# Patient Record
Sex: FEMALE | Race: OTHER | NOT HISPANIC OR LATINO
[De-identification: names, ages, dates, MRNs, and addresses within clinical notes are randomized per-mention and may not be internally consistent; named-entity substitution may affect disease eponyms.]

---

## 2020-10-23 PROBLEM — Z00.00 ENCOUNTER FOR PREVENTIVE HEALTH EXAMINATION: Status: ACTIVE | Noted: 2020-10-23

## 2020-10-30 ENCOUNTER — APPOINTMENT (OUTPATIENT)
Dept: GYNECOLOGIC ONCOLOGY | Facility: CLINIC | Age: 75
End: 2020-10-30
Payer: MEDICARE

## 2020-10-30 VITALS
RESPIRATION RATE: 16 BRPM | BODY MASS INDEX: 24.63 KG/M2 | TEMPERATURE: 96.9 F | OXYGEN SATURATION: 98 % | HEIGHT: 62.99 IN | WEIGHT: 139 LBS | DIASTOLIC BLOOD PRESSURE: 77 MMHG | SYSTOLIC BLOOD PRESSURE: 132 MMHG | HEART RATE: 71 BPM

## 2020-10-30 PROCEDURE — 99072 ADDL SUPL MATRL&STAF TM PHE: CPT

## 2020-10-30 PROCEDURE — 99205 OFFICE O/P NEW HI 60 MIN: CPT

## 2020-10-30 NOTE — HISTORY OF PRESENT ILLNESS
[FreeTextEntry1] : Problem List\par 1. Right ovarian cyst\par 2. Endometrial stripe 5mm\par \par Previous Therapy\par 1. Office pelvic sonogram 9/2020\par     a) right ovarian complex cyst 4.3 x 3.6cm \par 2.  9/22/20 - 9 Normal \par 3. MRI Pelvis 10/5/20\par     a)  uterus is 5.2cm with 0.5cm endometrial stripe \par     a) The right ovary is enlarged containing a 4.3 x 3.0 x 3.3cm multiloculated cyst with enhancing septations suspicious for cystic ovarian neoplasm. No enhancing soft tissue components are seen.

## 2020-10-30 NOTE — ASSESSMENT
[FreeTextEntry1] : I discussed with the patient with the aid of diagrams, reviewed the findings on history and physical examination, and reviewed the imaging studies in detail.  We reviewed the cystic and solid component of the mass, the CA-125, and other tumor markers. ACOG management of adnexal masses has been reviewed. \par \par We discussed the differential diagnosis which includes benign, low malignant potential tumors and malignancy. Other etiologies of adnexal masses, such as metastatic disease from another organ site also discussed.\par \par In the case of asymptomatic cysts, without findings concerning for malignancy (such as solid components, increasing size and vascular septations, elevated tumor markers) conservative management is an option. \par \par In the case of symptomatic cysts, or those with findings concerning for malignancy, the recommendation is for surgical removal. Again, with the aid of diagrams, different surgical approaches discussed including minimally invasive and open approaches.\par \par Laparoscopic bilateral salpingoophorectomy discussed. Increased risk of cyst rupture with ovarian cystectomy explained. Frozen section will be performed; and the appropriate additional management including but not limited to removal of the other ovary, total hysterectomy, pelvic and para-aortic lymph node dissection, omentectomy and appendectomy. NCCN guidelines discussed. Possible laparotomy also discussed. The role of hysterectomy even in benign cases was discussed. We also discussed surgical options for correction of prolapse. Patient endorsed interest in a potentially joint procedure with urogyn. I explained that the approach to this part of the procedure would be different depending on benign vs. malignant findings.\par \par Complications that include, but are not limited to: bleeding, infection, injury to other organs including bowel, bladder, ureters, blood vessels, nerves; infections, blood clots, lymphedema, pneumonia, wound complications and prolonged hospital stay have all been discussed with the patient. Whenever minimally invasive surgery is attempted, there is a chance of needing to convert to laparotomy. The risk of occult injury requiring additional surgery also discussed. I have also provided her with the diagrams.  \par \par Surgical scheduling was discussed and instructions for optimization prior to surgery were given. will follow the Enhanced Recovery After Surgery (ERAS) protocol.  \par No aspirin or NSAID products for 1 week prior. \par She will choose a surgical date.\par \par [] Referral to Dr. Villa\par [] Medical clearance\par [] COVID-19 testing\par [] Robot assisted TLH/BSO possible staging\par

## 2020-10-30 NOTE — CHIEF COMPLAINT
[FreeTextEntry1] : 74 y/o referred from Dr. Richards for further evaluation of right ovarian cyst. This was an incidental finding. Patient is asymptomatic. 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

## 2020-10-30 NOTE — PHYSICAL EXAM
[Normal] : Recto-Vaginal Exam: Normal [Fully active, able to carry on all pre-disease performance without restriction] : Status 0 - Fully active, able to carry on all pre-disease performance without restriction [de-identified] : pessary removed and replaced. apical prolapse noted

## 2020-11-02 DIAGNOSIS — A49.9 URINARY TRACT INFECTION, SITE NOT SPECIFIED: ICD-10-CM

## 2020-11-02 DIAGNOSIS — N39.0 URINARY TRACT INFECTION, SITE NOT SPECIFIED: ICD-10-CM

## 2020-11-04 ENCOUNTER — APPOINTMENT (OUTPATIENT)
Dept: UROGYNECOLOGY | Facility: CLINIC | Age: 75
End: 2020-11-04
Payer: MEDICARE

## 2020-11-04 VITALS
TEMPERATURE: 93.3 F | SYSTOLIC BLOOD PRESSURE: 150 MMHG | BODY MASS INDEX: 24.63 KG/M2 | WEIGHT: 139 LBS | DIASTOLIC BLOOD PRESSURE: 90 MMHG | HEIGHT: 63 IN

## 2020-11-04 PROCEDURE — 99204 OFFICE O/P NEW MOD 45 MIN: CPT

## 2020-11-04 PROCEDURE — 99072 ADDL SUPL MATRL&STAF TM PHE: CPT

## 2020-11-04 PROCEDURE — 51798 US URINE CAPACITY MEASURE: CPT

## 2020-11-05 DIAGNOSIS — Z80.3 FAMILY HISTORY OF MALIGNANT NEOPLASM OF BREAST: ICD-10-CM

## 2020-11-05 DIAGNOSIS — Z78.9 OTHER SPECIFIED HEALTH STATUS: ICD-10-CM

## 2020-11-05 DIAGNOSIS — Z63.4 DISAPPEARANCE AND DEATH OF FAMILY MEMBER: ICD-10-CM

## 2020-11-05 DIAGNOSIS — I10 ESSENTIAL (PRIMARY) HYPERTENSION: ICD-10-CM

## 2020-11-05 DIAGNOSIS — E11.9 TYPE 2 DIABETES MELLITUS W/OUT COMPLICATIONS: ICD-10-CM

## 2020-11-05 RX ORDER — ALENDRONATE SODIUM 70 MG/1
TABLET ORAL
Refills: 0 | Status: ACTIVE | COMMUNITY

## 2020-11-05 RX ORDER — LOSARTAN POTASSIUM 100 MG/1
TABLET, FILM COATED ORAL
Refills: 0 | Status: ACTIVE | COMMUNITY

## 2020-11-05 RX ORDER — EMPAGLIFLOZIN 10 MG/1
10 TABLET, FILM COATED ORAL
Refills: 0 | Status: ACTIVE | COMMUNITY

## 2020-11-05 RX ORDER — METFORMIN HYDROCHLORIDE 850 MG/1
850 TABLET, COATED ORAL
Qty: 90 | Refills: 0 | Status: ACTIVE | COMMUNITY
Start: 2020-09-22

## 2020-11-05 RX ORDER — ATENOLOL 25 MG/1
25 TABLET ORAL
Refills: 0 | Status: ACTIVE | COMMUNITY

## 2020-11-05 SDOH — SOCIAL STABILITY - SOCIAL INSECURITY: DISSAPEARANCE AND DEATH OF FAMILY MEMBER: Z63.4

## 2020-11-06 LAB — BACTERIA UR CULT: NORMAL

## 2020-11-07 NOTE — HISTORY OF PRESENT ILLNESS
[FreeTextEntry1] : The pt is a 74 y/o P1 with UV prolapse for 3 yrs, ?bothersome\par She is wearing a pessary for the past 3 yrs, ring with support.  She is not able to manage it on her own.\par She denies UI .\par She feels complete emptying of her bladder.\par She voids every Q4 hrs with a medium volume and has nocturia 1x \par Her liquid intake consists of 8 glasses of water, 1 cup of coffee/day.\par She has a BM q day .\par She denies gross hematuria, recurrent UTIs, hx of nephrolithiaisis \par The last time she had intercourse was 10 yrs ago,  passed . \par She denies having abdominal surgery.\par Her medical prob consists of HTN, NIDDM.\par Last HbA1C was 7.5, in 10/2020\par \par

## 2020-11-07 NOTE — ASSESSMENT
[FreeTextEntry1] : Today's findings were discussed with the pt.  She will need a repeat exam wo the pessary.  She will f/u in 3 days for evaluation of her prolapse severity.   Also, her urine was sent for culture today.\par I will plan for concurrent surgery with Dr. Clayton.\par \par \par

## 2020-11-07 NOTE — PHYSICAL EXAM
[Anxiety] : patient is not anxious [Cough] : no cough [Dyspnea] : no dyspnea [Murmurs] : no murmurs were heard [Varicose Veins] : no varicose veins observed [Tracheal Deviation] : no tracheal deviation observed [Mass] : no ~M [unfilled] neck mass was observed [Mass (___ Cm)] : no ~M [unfilled] abdominal mass was palpated [Tenderness] : ~T no ~M abdominal tenderness observed [Distended] : not distended [H/Smegaly] : no hepatosplenomegaly [Hernia] : no hernia observed [Scar] : no scars [Estrogen Effect] : no estrogen effect was observed [de-identified] : good support otherwise.  Pessary removed just prior to exam

## 2020-11-09 ENCOUNTER — APPOINTMENT (OUTPATIENT)
Dept: UROGYNECOLOGY | Facility: CLINIC | Age: 75
End: 2020-11-09
Payer: MEDICARE

## 2020-11-09 PROCEDURE — 99072 ADDL SUPL MATRL&STAF TM PHE: CPT

## 2020-11-09 PROCEDURE — 51798 US URINE CAPACITY MEASURE: CPT

## 2020-11-09 PROCEDURE — 99213 OFFICE O/P EST LOW 20 MIN: CPT

## 2020-11-09 NOTE — HISTORY OF PRESENT ILLNESS
[FreeTextEntry1] : The pt is here for pelvic exam to assess the severity of her prolapse wo the pessary prior to undergoing surgery.

## 2020-11-09 NOTE — ASSESSMENT
[FreeTextEntry1] : She will be scheduled for UDS.\par She is a good candidate for robotic colpopexy if she does not have malignancy.  If + for cancer, will proceed with uterosacral ligament suspension.

## 2020-11-09 NOTE — PHYSICAL EXAM
[No Acute Distress] : in no acute distress [Well developed] : well developed [Well Nourished] : ~L well nourished [Good Hygeine] : demonstrates good hygeine [de-identified] : complete procidentia wo

## 2020-11-13 DIAGNOSIS — Z01.818 ENCOUNTER FOR OTHER PREPROCEDURAL EXAMINATION: ICD-10-CM

## 2020-11-16 ENCOUNTER — APPOINTMENT (OUTPATIENT)
Dept: UROGYNECOLOGY | Facility: CLINIC | Age: 75
End: 2020-11-16
Payer: MEDICARE

## 2020-11-16 ENCOUNTER — APPOINTMENT (OUTPATIENT)
Dept: DISASTER EMERGENCY | Facility: CLINIC | Age: 75
End: 2020-11-16

## 2020-11-16 PROCEDURE — 51798 US URINE CAPACITY MEASURE: CPT

## 2020-11-16 PROCEDURE — 51729 CYSTOMETROGRAM W/VP&UP: CPT

## 2020-11-16 PROCEDURE — 51797 INTRAABDOMINAL PRESSURE TEST: CPT

## 2020-11-16 PROCEDURE — 51741 ELECTRO-UROFLOWMETRY FIRST: CPT

## 2020-11-16 PROCEDURE — 99072 ADDL SUPL MATRL&STAF TM PHE: CPT

## 2020-11-16 PROCEDURE — 51784 ANAL/URINARY MUSCLE STUDY: CPT

## 2020-11-17 LAB — SARS-COV-2 N GENE NPH QL NAA+PROBE: NOT DETECTED

## 2020-11-18 ENCOUNTER — APPOINTMENT (OUTPATIENT)
Dept: UROGYNECOLOGY | Facility: CLINIC | Age: 75
End: 2020-11-18
Payer: MEDICARE

## 2020-11-18 ENCOUNTER — TRANSCRIPTION ENCOUNTER (OUTPATIENT)
Age: 75
End: 2020-11-18

## 2020-11-18 DIAGNOSIS — R93.89 ABNORMAL FINDINGS ON DIAGNOSTIC IMAGING OF OTHER SPECIFIED BODY STRUCTURES: ICD-10-CM

## 2020-11-18 PROCEDURE — 99213 OFFICE O/P EST LOW 20 MIN: CPT

## 2020-11-18 RX ORDER — IBUPROFEN 800 MG/1
800 TABLET, FILM COATED ORAL 3 TIMES DAILY
Qty: 30 | Refills: 0 | Status: ACTIVE | COMMUNITY
Start: 2020-11-18 | End: 1900-01-01

## 2020-11-18 RX ORDER — DOCUSATE SODIUM 100 MG/1
100 CAPSULE ORAL TWICE DAILY
Qty: 20 | Refills: 0 | Status: ACTIVE | COMMUNITY
Start: 2020-11-18 | End: 1900-01-01

## 2020-11-18 RX ORDER — OXYCODONE AND ACETAMINOPHEN 5; 325 MG/1; MG/1
5-325 TABLET ORAL
Qty: 10 | Refills: 0 | Status: ACTIVE | COMMUNITY
Start: 2020-11-18 | End: 1900-01-01

## 2020-11-18 RX ORDER — METFORMIN HYDROCHLORIDE 850 MG/1
0 TABLET ORAL
Qty: 0 | Refills: 0 | DISCHARGE

## 2020-11-18 NOTE — PATIENT PROFILE ADULT - HOME ACCESSIBILITY CONCERNS
Temo Barrientos)  Urology  [address] [place] [address], [address]  [place] [address]  Phone: [phone]  Fax: [phone]  Follow Up Time: 1-3 Days
none

## 2020-11-18 NOTE — HISTORY OF PRESENT ILLNESS
[FreeTextEntry1] : The pt is here for UDS F/U and to discuss surgical options.\par \par UDS: -JAIME, -DO, +complete emptying with prolapse reduction

## 2020-11-18 NOTE — ASSESSMENT
[FreeTextEntry1] : UDS result was dw the pt.\par She is scheduled to undergo robotic total hyst with BSO by Dr. Clayton tomorrow and I will perform pelvic reconstructive surgery for complete procidentia at the time of her hysterectomy.\par We discussed robotic colpopexy, vaginal USLS and colpocleisis depending on the path result.\par She is still interest in having intercourse in the future.\par The choice of surgery will be determined based on the path result intraoperatively.\par \par \par \par \par \par

## 2020-11-19 ENCOUNTER — APPOINTMENT (OUTPATIENT)
Dept: GYNECOLOGIC ONCOLOGY | Facility: HOSPITAL | Age: 75
End: 2020-11-19

## 2020-11-19 ENCOUNTER — NON-APPOINTMENT (OUTPATIENT)
Age: 75
End: 2020-11-19

## 2020-11-19 ENCOUNTER — APPOINTMENT (OUTPATIENT)
Dept: UROGYNECOLOGY | Facility: HOSPITAL | Age: 75
End: 2020-11-19
Payer: MEDICARE

## 2020-11-19 ENCOUNTER — INPATIENT (INPATIENT)
Facility: HOSPITAL | Age: 75
LOS: 0 days | Discharge: HOME | DRG: 743 | End: 2020-11-20
Attending: OBSTETRICS & GYNECOLOGY | Admitting: OBSTETRICS & GYNECOLOGY
Payer: MEDICARE

## 2020-11-19 ENCOUNTER — RESULT REVIEW (OUTPATIENT)
Age: 75
End: 2020-11-19

## 2020-11-19 VITALS
DIASTOLIC BLOOD PRESSURE: 72 MMHG | WEIGHT: 141.32 LBS | HEIGHT: 63 IN | RESPIRATION RATE: 16 BRPM | OXYGEN SATURATION: 99 % | SYSTOLIC BLOOD PRESSURE: 128 MMHG | TEMPERATURE: 97 F | HEART RATE: 52 BPM

## 2020-11-19 LAB
BACTERIA UR CULT: ABNORMAL
BLD GP AB SCN SERPL QL: NEGATIVE — SIGNIFICANT CHANGE UP
GLUCOSE BLDC GLUCOMTR-MCNC: 91 MG/DL — SIGNIFICANT CHANGE UP (ref 70–99)
RH IG SCN BLD-IMP: POSITIVE — SIGNIFICANT CHANGE UP

## 2020-11-19 PROCEDURE — 88342 IMHCHEM/IMCYTCHM 1ST ANTB: CPT | Mod: 26

## 2020-11-19 PROCEDURE — 58573 TLH W/T/O UTERUS OVER 250 G: CPT

## 2020-11-19 PROCEDURE — 88305 TISSUE EXAM BY PATHOLOGIST: CPT | Mod: 26

## 2020-11-19 PROCEDURE — 88341 IMHCHEM/IMCYTCHM EA ADD ANTB: CPT | Mod: 26

## 2020-11-19 PROCEDURE — 88112 CYTOPATH CELL ENHANCE TECH: CPT | Mod: 26

## 2020-11-19 PROCEDURE — 88307 TISSUE EXAM BY PATHOLOGIST: CPT | Mod: 26

## 2020-11-19 PROCEDURE — 52000 CYSTOURETHROSCOPY: CPT | Mod: 59

## 2020-11-19 PROCEDURE — 57425 LAPAROSCOPY SURG COLPOPEXY: CPT

## 2020-11-19 PROCEDURE — 88331 PATH CONSLTJ SURG 1 BLK 1SPC: CPT | Mod: 26

## 2020-11-19 RX ORDER — METOCLOPRAMIDE HCL 10 MG
10 TABLET ORAL EVERY 8 HOURS
Refills: 0 | Status: DISCONTINUED | OUTPATIENT
Start: 2020-11-19 | End: 2020-11-19

## 2020-11-19 RX ORDER — OXYCODONE HYDROCHLORIDE 5 MG/1
10 TABLET ORAL EVERY 6 HOURS
Refills: 0 | Status: DISCONTINUED | OUTPATIENT
Start: 2020-11-19 | End: 2020-11-20

## 2020-11-19 RX ORDER — INFLUENZA VIRUS VACCINE 15; 15; 15; 15 UG/.5ML; UG/.5ML; UG/.5ML; UG/.5ML
0.7 SUSPENSION INTRAMUSCULAR ONCE
Refills: 0 | Status: DISCONTINUED | OUTPATIENT
Start: 2020-11-19 | End: 2020-11-20

## 2020-11-19 RX ORDER — SODIUM CHLORIDE 9 MG/ML
1000 INJECTION, SOLUTION INTRAVENOUS
Refills: 0 | Status: DISCONTINUED | OUTPATIENT
Start: 2020-11-19 | End: 2020-11-20

## 2020-11-19 RX ORDER — INSULIN LISPRO 100/ML
VIAL (ML) SUBCUTANEOUS ONCE
Refills: 0 | Status: COMPLETED | OUTPATIENT
Start: 2020-11-19 | End: 2020-11-19

## 2020-11-19 RX ORDER — GABAPENTIN 400 MG/1
300 CAPSULE ORAL ONCE
Refills: 0 | Status: COMPLETED | OUTPATIENT
Start: 2020-11-19 | End: 2020-11-19

## 2020-11-19 RX ORDER — PHENAZOPYRIDINE HCL 100 MG
200 TABLET ORAL ONCE
Refills: 0 | Status: COMPLETED | OUTPATIENT
Start: 2020-11-19 | End: 2020-11-19

## 2020-11-19 RX ORDER — DEXTROSE 50 % IN WATER 50 %
15 SYRINGE (ML) INTRAVENOUS ONCE
Refills: 0 | Status: DISCONTINUED | OUTPATIENT
Start: 2020-11-19 | End: 2020-11-20

## 2020-11-19 RX ORDER — SENNA PLUS 8.6 MG/1
1 TABLET ORAL AT BEDTIME
Refills: 0 | Status: DISCONTINUED | OUTPATIENT
Start: 2020-11-19 | End: 2020-11-20

## 2020-11-19 RX ORDER — DEXTROSE 50 % IN WATER 50 %
25 SYRINGE (ML) INTRAVENOUS ONCE
Refills: 0 | Status: DISCONTINUED | OUTPATIENT
Start: 2020-11-19 | End: 2020-11-20

## 2020-11-19 RX ORDER — EMPAGLIFLOZIN 10 MG/1
1 TABLET, FILM COATED ORAL
Qty: 0 | Refills: 0 | DISCHARGE

## 2020-11-19 RX ORDER — GLUCAGON INJECTION, SOLUTION 0.5 MG/.1ML
1 INJECTION, SOLUTION SUBCUTANEOUS ONCE
Refills: 0 | Status: DISCONTINUED | OUTPATIENT
Start: 2020-11-19 | End: 2020-11-20

## 2020-11-19 RX ORDER — LOSARTAN POTASSIUM 100 MG/1
1 TABLET, FILM COATED ORAL
Qty: 0 | Refills: 0 | DISCHARGE

## 2020-11-19 RX ORDER — CELECOXIB 200 MG/1
400 CAPSULE ORAL ONCE
Refills: 0 | Status: COMPLETED | OUTPATIENT
Start: 2020-11-19 | End: 2020-11-19

## 2020-11-19 RX ORDER — OXYCODONE HYDROCHLORIDE 5 MG/1
5 TABLET ORAL
Refills: 0 | Status: DISCONTINUED | OUTPATIENT
Start: 2020-11-19 | End: 2020-11-19

## 2020-11-19 RX ORDER — ONDANSETRON 8 MG/1
8 TABLET, FILM COATED ORAL EVERY 6 HOURS
Refills: 0 | Status: DISCONTINUED | OUTPATIENT
Start: 2020-11-19 | End: 2020-11-20

## 2020-11-19 RX ORDER — HEPARIN SODIUM 5000 [USP'U]/ML
5000 INJECTION INTRAVENOUS; SUBCUTANEOUS ONCE
Refills: 0 | Status: COMPLETED | OUTPATIENT
Start: 2020-11-19 | End: 2020-11-19

## 2020-11-19 RX ORDER — HYDROMORPHONE HYDROCHLORIDE 2 MG/ML
0.5 INJECTION INTRAMUSCULAR; INTRAVENOUS; SUBCUTANEOUS
Refills: 0 | Status: DISCONTINUED | OUTPATIENT
Start: 2020-11-19 | End: 2020-11-20

## 2020-11-19 RX ORDER — ATENOLOL 25 MG/1
1 TABLET ORAL
Qty: 0 | Refills: 0 | DISCHARGE

## 2020-11-19 RX ORDER — INFLUENZA VIRUS VACCINE 15; 15; 15; 15 UG/.5ML; UG/.5ML; UG/.5ML; UG/.5ML
0.5 SUSPENSION INTRAMUSCULAR ONCE
Refills: 0 | Status: DISCONTINUED | OUTPATIENT
Start: 2020-11-19 | End: 2020-11-19

## 2020-11-19 RX ORDER — METFORMIN HYDROCHLORIDE 850 MG/1
0 TABLET ORAL
Qty: 0 | Refills: 0 | DISCHARGE

## 2020-11-19 RX ORDER — DEXTROSE 50 % IN WATER 50 %
12.5 SYRINGE (ML) INTRAVENOUS ONCE
Refills: 0 | Status: DISCONTINUED | OUTPATIENT
Start: 2020-11-19 | End: 2020-11-20

## 2020-11-19 RX ORDER — ACETAMINOPHEN 500 MG
1000 TABLET ORAL EVERY 6 HOURS
Refills: 0 | Status: DISCONTINUED | OUTPATIENT
Start: 2020-11-19 | End: 2020-11-19

## 2020-11-19 RX ORDER — SIMETHICONE 80 MG/1
80 TABLET, CHEWABLE ORAL EVERY 6 HOURS
Refills: 0 | Status: DISCONTINUED | OUTPATIENT
Start: 2020-11-19 | End: 2020-11-20

## 2020-11-19 RX ORDER — OXYCODONE HYDROCHLORIDE 5 MG/1
10 TABLET ORAL EVERY 4 HOURS
Refills: 0 | Status: DISCONTINUED | OUTPATIENT
Start: 2020-11-19 | End: 2020-11-19

## 2020-11-19 RX ORDER — ACETAMINOPHEN 500 MG
975 TABLET ORAL EVERY 6 HOURS
Refills: 0 | Status: DISCONTINUED | OUTPATIENT
Start: 2020-11-19 | End: 2020-11-20

## 2020-11-19 RX ORDER — ENOXAPARIN SODIUM 100 MG/ML
40 INJECTION SUBCUTANEOUS EVERY 24 HOURS
Refills: 0 | Status: DISCONTINUED | OUTPATIENT
Start: 2020-11-20 | End: 2020-11-20

## 2020-11-19 RX ORDER — ACETAMINOPHEN 500 MG
1000 TABLET ORAL ONCE
Refills: 0 | Status: COMPLETED | OUTPATIENT
Start: 2020-11-19 | End: 2020-11-19

## 2020-11-19 RX ORDER — SODIUM CHLORIDE 9 MG/ML
1000 INJECTION, SOLUTION INTRAVENOUS
Refills: 0 | Status: DISCONTINUED | OUTPATIENT
Start: 2020-11-19 | End: 2020-11-19

## 2020-11-19 RX ORDER — ONDANSETRON 8 MG/1
8 TABLET, FILM COATED ORAL EVERY 8 HOURS
Refills: 0 | Status: DISCONTINUED | OUTPATIENT
Start: 2020-11-19 | End: 2020-11-19

## 2020-11-19 RX ORDER — OXYCODONE HYDROCHLORIDE 5 MG/1
5 TABLET ORAL EVERY 4 HOURS
Refills: 0 | Status: DISCONTINUED | OUTPATIENT
Start: 2020-11-19 | End: 2020-11-20

## 2020-11-19 RX ADMIN — GABAPENTIN 300 MILLIGRAM(S): 400 CAPSULE ORAL at 10:23

## 2020-11-19 RX ADMIN — Medication 200 MILLIGRAM(S): at 10:24

## 2020-11-19 RX ADMIN — Medication 1 TABLET(S): at 20:08

## 2020-11-19 RX ADMIN — CELECOXIB 400 MILLIGRAM(S): 200 CAPSULE ORAL at 10:25

## 2020-11-19 RX ADMIN — Medication 1000 MILLIGRAM(S): at 18:54

## 2020-11-19 RX ADMIN — Medication 975 MILLIGRAM(S): at 23:29

## 2020-11-19 RX ADMIN — HEPARIN SODIUM 5000 UNIT(S): 5000 INJECTION INTRAVENOUS; SUBCUTANEOUS at 10:23

## 2020-11-19 RX ADMIN — CELECOXIB 400 MILLIGRAM(S): 200 CAPSULE ORAL at 10:23

## 2020-11-19 RX ADMIN — Medication 1000 MILLIGRAM(S): at 10:23

## 2020-11-19 RX ADMIN — Medication 400 MILLIGRAM(S): at 18:31

## 2020-11-19 RX ADMIN — Medication 1000 MILLIGRAM(S): at 10:25

## 2020-11-19 NOTE — PROGRESS NOTE ADULT - SUBJECTIVE AND OBJECTIVE BOX
Patient seen and examined at bedside. No acute complaints. Pain well controlled.  Patient tolerating clears. Has not yet passed flatus. Maravilla in place. Denies CP, SOB, N/V, fevers, and chills.    Vital Signs Last 24 Hours  T(C): 37.3 (11-19-20 @ 20:24), Max: 37.3 (11-19-20 @ 20:24)  HR: 55 (11-19-20 @ 20:24) (50 - 59)  BP: 117/65 (11-19-20 @ 20:24) (105/59 - 130/61)  RR: 15 (11-19-20 @ 20:24) (14 - 23)  SpO2: 96% (11-19-20 @ 20:24) (96% - 100%)    I&O's Summary    19 Nov 2020 07:01  -  19 Nov 2020 20:29  --------------------------------------------------------  IN: 1275 mL / OUT: 575 mL / NET: 700 mL        Physical Exam:  General: NAD  CV: NR, RR, S1, S2, no M/R/G  Lungs: CTA-B  Abdomen: Soft, non-distended, appropriately tender  Incision: port site incisions CDI, dermabond in place   Ext: No pain or swelling    Labs:      MEDICATIONS  (STANDING):  acetaminophen   Tablet .. 975 milliGRAM(s) Oral every 6 hours  dextrose 40% Gel 15 Gram(s) Oral Once  dextrose 5%. 1000 milliLiter(s) (50 mL/Hr) IV Continuous <Continuous>  dextrose 5%. 1000 milliLiter(s) (100 mL/Hr) IV Continuous <Continuous>  dextrose 50% Injectable 25 Gram(s) IV Push Once  dextrose 50% Injectable 12.5 Gram(s) IV Push Once  dextrose 50% Injectable 25 Gram(s) IV Push Once  glucagon  Injectable 1 milliGRAM(s) IntraMuscular Once  influenza  Vaccine (HIGH DOSE) 0.7 milliLiter(s) IntraMuscular once  insulin lispro (ADMELOG) corrective regimen sliding scale   SubCutaneous Once  lactated ringers. 1000 milliLiter(s) (125 mL/Hr) IV Continuous <Continuous>  trimethoprim  160 mG/sulfamethoxazole 800 mG 1 Tablet(s) Oral two times a day    MEDICATIONS  (PRN):  HYDROmorphone  Injectable 0.5 milliGRAM(s) IV Push every 15 minutes PRN Severe Pain (7 - 10)  ondansetron Injectable 8 milliGRAM(s) IV Push every 6 hours PRN Nausea and/or Vomiting  oxyCODONE    IR 5 milliGRAM(s) Oral every 4 hours PRN Moderate Pain (4 - 6)  oxyCODONE    IR 10 milliGRAM(s) Oral every 6 hours PRN Severe Pain (7 - 10)  senna 1 Tablet(s) Oral at bedtime PRN Constipation  simethicone 80 milliGRAM(s) Chew every 6 hours PRN Gas

## 2020-11-19 NOTE — BRIEF OPERATIVE NOTE - NSICDXBRIEFPROCEDURE_GEN_ALL_CORE_FT
PROCEDURES:  Bilateral salpingoophorectomy 19-Nov-2020 13:34:45  Jenelle Lam  Robot-assisted total hysterectomy for uterus less than 250 grams 19-Nov-2020 13:34:31  eJnelle Lam  
PROCEDURES:  Cystoscopy 19-Nov-2020 16:11:31  Ruth Aparicio  Sacrocolpopexy, robot-assisted 19-Nov-2020 16:09:39  Ruth Aparicio

## 2020-11-19 NOTE — H&P ADULT - HISTORY OF PRESENT ILLNESS
75y P1 with right ovarian complex cyst presenting and uterovaginal prolapse for scheduled RA TLH BSO and possible colpopexy, vaginal USLS. Patient has uterovaginal prolapse and complex right ovarian cyst. Patients  is normal at 9.7.     Denies fever, chills, chest pain, palpitations, SOB, n/v.    MRI 10/5/20: uterus 5.2cm with 0.5 endometrial stripe, right ovary enlarged with 4.3 x 3x 3.3cm multiloculated cyst with enhancing septations suspicious for cystic ovarian neoplasm, no enhancing soft tissue components    OB H/x:  x1    GYN H/x: right ovarian cyst    MED H/x: HTN , DM    SURG H/x: denies     Medications:  metformin 850mg BID  atenolol 25mg qd  losartan 25mg qd  jardiance 10mg qd  ASDA 81mg qd   Allergies:  NKDA        Vital Signs Last 24 Hrs  T(C): 36.3 (2020 10:00), Max: 36.3 (2020 10:00)  T(F): 97.4 (2020 10:00), Max: 97.4 (2020 10:00)  HR: 52 (2020 10:00) (52 - 52)  BP: 128/72 (2020 10:00) (128/72 - 128/72)  BP(mean): --  RR: 16 (2020 10:00) (16 - 16)  SpO2: 99% (2020 10:00) (99% - 99%)    Physical Exam:  Gen: NAD, comfortable  GI: soft, nontender, nondistended + BS, no rebound no guarding  Ext: no edema, erythema, tenderness     LABS:                RADIOLOGY & ADDITIONAL STUDIES:

## 2020-11-19 NOTE — BRIEF OPERATIVE NOTE - OPERATION/FINDINGS
Robotic assisted laparoscopy revealed right upper abdominal wall adhesions that were carefully lysed. Laparoscopic revealed about 8 wk size uterus with adhesions noted between left adnexa and left side wall. Normal appearing right fallopian tube and right ovarian cyst with thin wall and non solid appearing contents. RA laparoscopic total hysterectomy and bilateral salpingooophorectomy. Cervix, uterus, bilateral fallopian tubes, and bilateral ovaries with right cyst removed through vagina. Right ovarian cyst sent for frozen pathology results benign. Vaginal cuff closed with V lock suture, good hemostasis confirmed. Pelvis thoroughly suctioned and irrigated. Reminder of procedure by Uro-Gyn Dr. ALL Villa.
Patient immediately s/p hysterectomy at time our portion of the procedure began.  RA sacrocolpopexy performed with mesh.  Cystoscopy performed and revealed intact bladder with bilateral ureteral jets visualized.

## 2020-11-19 NOTE — H&P ADULT - ASSESSMENT
75y P1 with right ovarian complex cyst presenting and uterovaginal prolapse for scheduled RA TLH BSO and possible colpopexy, vaginal USLS.  - Hgb 14.2, Cr 0.58  - 2uprbc on hold   - consents obtained by Dr. Clayton and Dr. Villa

## 2020-11-19 NOTE — PROGRESS NOTE ADULT - ASSESSMENT
75 year old POD#0 s/p RA Total Laparoscopic Hysterectomy, BSO, sacrocolpopexy and cystoscopy without complication. Patient is stable and doing well postoperatively.       Neuro: tylenol ATC; dilaudid PRN   CV: Hemodynamically stable  Pulm: Saturating well on room air. Encourage incentive spirometry  GI: Continue to advance diet as tolerated   : UOP adequate, monitor overnight  Heme: SCDs; f/u AM CBC, lovenox in AM     Andrés Deras PGY2

## 2020-11-19 NOTE — BRIEF OPERATIVE NOTE - NSICDXBRIEFPOSTOP_GEN_ALL_CORE_FT
POST-OP DIAGNOSIS:  Right ovarian cyst 19-Nov-2020 13:35:36  Jenelle Lam  
POST-OP DIAGNOSIS:  Prolapse of female pelvic organs 19-Nov-2020 16:12:01  Ruth Aparicio

## 2020-11-19 NOTE — PRE-OP CHECKLIST - ISOLATION PRECAUTIONS
none radiology results/return to ED if symptoms worsen, persist or questions arise/need for outpatient follow-up

## 2020-11-19 NOTE — BRIEF OPERATIVE NOTE - NSICDXBRIEFPREOP_GEN_ALL_CORE_FT
PRE-OP DIAGNOSIS:  Complex cyst of right ovary 19-Nov-2020 13:35:02  Jenelle Lam  
PRE-OP DIAGNOSIS:  Prolapse of female pelvic organs 19-Nov-2020 16:11:48  Ruth Aparicio

## 2020-11-20 ENCOUNTER — TRANSCRIPTION ENCOUNTER (OUTPATIENT)
Age: 75
End: 2020-11-20

## 2020-11-20 VITALS
HEART RATE: 61 BPM | SYSTOLIC BLOOD PRESSURE: 139 MMHG | RESPIRATION RATE: 18 BRPM | DIASTOLIC BLOOD PRESSURE: 61 MMHG | OXYGEN SATURATION: 97 % | TEMPERATURE: 98 F

## 2020-11-20 LAB
A1C WITH ESTIMATED AVERAGE GLUCOSE RESULT: 6.8 % — HIGH (ref 4–5.6)
ESTIMATED AVERAGE GLUCOSE: 148 MG/DL — HIGH (ref 68–114)
HCT VFR BLD CALC: 38.1 % — SIGNIFICANT CHANGE UP (ref 34.5–45)
HCV AB S/CO SERPL IA: 0.06 S/CO — SIGNIFICANT CHANGE UP
HCV AB SERPL-IMP: SIGNIFICANT CHANGE UP
HGB BLD-MCNC: 12.3 G/DL — SIGNIFICANT CHANGE UP (ref 11.5–15.5)
MCHC RBC-ENTMCNC: 30.5 PG — SIGNIFICANT CHANGE UP (ref 27–34)
MCHC RBC-ENTMCNC: 32.3 GM/DL — SIGNIFICANT CHANGE UP (ref 32–36)
MCV RBC AUTO: 94.5 FL — SIGNIFICANT CHANGE UP (ref 80–100)
NON-GYNECOLOGICAL CYTOLOGY STUDY: SIGNIFICANT CHANGE UP
NRBC # BLD: 0 /100 WBCS — SIGNIFICANT CHANGE UP (ref 0–0)
PLATELET # BLD AUTO: 162 K/UL — SIGNIFICANT CHANGE UP (ref 150–400)
RBC # BLD: 4.03 M/UL — SIGNIFICANT CHANGE UP (ref 3.8–5.2)
RBC # FLD: 13.2 % — SIGNIFICANT CHANGE UP (ref 10.3–14.5)
WBC # BLD: 8.18 K/UL — SIGNIFICANT CHANGE UP (ref 3.8–10.5)
WBC # FLD AUTO: 8.18 K/UL — SIGNIFICANT CHANGE UP (ref 3.8–10.5)

## 2020-11-20 PROCEDURE — 86900 BLOOD TYPING SEROLOGIC ABO: CPT

## 2020-11-20 PROCEDURE — 36415 COLL VENOUS BLD VENIPUNCTURE: CPT

## 2020-11-20 PROCEDURE — 85027 COMPLETE CBC AUTOMATED: CPT

## 2020-11-20 PROCEDURE — 83036 HEMOGLOBIN GLYCOSYLATED A1C: CPT

## 2020-11-20 PROCEDURE — 88331 PATH CONSLTJ SURG 1 BLK 1SPC: CPT

## 2020-11-20 PROCEDURE — 86850 RBC ANTIBODY SCREEN: CPT

## 2020-11-20 PROCEDURE — S2900: CPT

## 2020-11-20 PROCEDURE — 88307 TISSUE EXAM BY PATHOLOGIST: CPT

## 2020-11-20 PROCEDURE — C1781: CPT

## 2020-11-20 PROCEDURE — 82962 GLUCOSE BLOOD TEST: CPT

## 2020-11-20 PROCEDURE — 86901 BLOOD TYPING SEROLOGIC RH(D): CPT

## 2020-11-20 PROCEDURE — 88112 CYTOPATH CELL ENHANCE TECH: CPT

## 2020-11-20 PROCEDURE — 86803 HEPATITIS C AB TEST: CPT

## 2020-11-20 PROCEDURE — C1889: CPT

## 2020-11-20 PROCEDURE — 88305 TISSUE EXAM BY PATHOLOGIST: CPT

## 2020-11-20 PROCEDURE — 88341 IMHCHEM/IMCYTCHM EA ADD ANTB: CPT

## 2020-11-20 RX ORDER — INSULIN LISPRO 100/ML
VIAL (ML) SUBCUTANEOUS
Refills: 0 | Status: DISCONTINUED | OUTPATIENT
Start: 2020-11-20 | End: 2020-11-20

## 2020-11-20 RX ADMIN — Medication 975 MILLIGRAM(S): at 05:27

## 2020-11-20 RX ADMIN — Medication 975 MILLIGRAM(S): at 05:50

## 2020-11-20 RX ADMIN — Medication 975 MILLIGRAM(S): at 12:47

## 2020-11-20 RX ADMIN — Medication 975 MILLIGRAM(S): at 13:47

## 2020-11-20 RX ADMIN — ENOXAPARIN SODIUM 40 MILLIGRAM(S): 100 INJECTION SUBCUTANEOUS at 05:27

## 2020-11-20 RX ADMIN — Medication 975 MILLIGRAM(S): at 00:00

## 2020-11-20 RX ADMIN — Medication 1 TABLET(S): at 07:19

## 2020-11-20 NOTE — DISCHARGE NOTE PROVIDER - NSDCFUADDINST_GEN_ALL_CORE_FT
Discharge Instructions for Patients of Dr. Iliana Villa:    Activities:  - Avoid insertig anything in the vagina and avoid intercourse for 2 months if you had vaginal surgery   - Do not drive for 2 weeks  - Avoid heavy lifting (>10 lbs) for 2 weeks  - Do not work with machinery for 24 hours  - Rest at home for the first 24 hours  - Do not make any important decisions for 24 hours     Diet:  - DO NOT drink alcohol for 24 hours  - Eat normally as tolerated     Wound Care:   - Do not take the steristrips off for 1 week     Bathing:   - Avoid baths/swimming for 4-6 weeks   - Shower normally     Medication Instructions:  - Prescrptions called into your pharmacy if applicable   - If you have a catheter, please take the antibiotic daily until the catheter is removed  - If you do not have a catheter, you DO NOT need to take antibiotics   - Do not drive if taking narcotic pain medications  - Take Motrin 600 mg every 6-8 hours and or Tylenol 550 mg every 4-6 hours for pain as first line   - If you are still experiencing pain, then take narcotic based pain medication as needed.   - You may restart all your pre-procedural medications as directed.   - Take Miralax 17 g daily. If you have not had a BM in greater than 2 days, take Milk of Magnesium as directed.     Follow-Up Care:   - Call the office to confirm your follow up appointment.   - If you have a catheter, be sure to follow up in 1 week.

## 2020-11-20 NOTE — DISCHARGE NOTE PROVIDER - NSDCFUSCHEDAPPT_GEN_ALL_CORE_FT
MONIQUE BAKER ; 11/30/2020 ; [facility] [address]  MONIQUE BAKER ; 12/18/2020 ; [facility] [address]

## 2020-11-20 NOTE — PROGRESS NOTE ADULT - ASSESSMENT
75 year old POD#1 s/p RA Total Laparoscopic Hysterectomy, BSO, sacrocolpopexy and cystoscopy without complication. Patient is stable and doing well postoperatively.     1. Neuro/Pain:  tylenol, oxycodone PRN , pain controlled   2  CV:   VS per routine , LR @ 125cc , to [place] today , hemodynamically stable  3. Pulm: Encourage ISS  4. GI: Reg, no nausea/vomiting   5. :  bedolla in place per Dr. Villa, active TOV per Dr. Villa  6. Heme: f/u AM CBC  7. Endo: ISS as patient is DM   8. DVT ppx: SCDs  9. Dispo: per UroGYN team Dr. Villa, recovering well from hysterectomy, f/u outpatient with Dr. Clayton

## 2020-11-20 NOTE — DISCHARGE NOTE PROVIDER - CARE PROVIDER_API CALL
Iliana Villa (MD)  Female Pelvic MedReconst Surg; Obstetrics and Gynecology  [address] [place] [address]  Phone: [phone]  Fax: [phone]  Follow Up Time:

## 2020-11-20 NOTE — PROGRESS NOTE ADULT - SUBJECTIVE AND OBJECTIVE BOX
GYN Progress Note    Patient seen at bedside this morning. Feeling well.   Pain is well controlled.  Tolerating sips of clears since procedure. Bedolla in place. Has not gotten out of bed since procedure.   Denies CP, palpitations, SOB, fever, chills, nausea, vomiting.    Vital Signs Last 24 Hrs  T(C): 36.6 (20 Nov 2020 05:00), Max: 37.3 (19 Nov 2020 20:24)  T(F): 97.9 (20 Nov 2020 05:00), Max: 99.1 (19 Nov 2020 20:24)  HR: 59 (20 Nov 2020 05:00) (50 - 59)  BP: 121/69 (20 Nov 2020 05:00) (95/51 - 130/61)  BP(mean): 75 (19 Nov 2020 18:00) (75 - 88)  RR: 16 (20 Nov 2020 05:00) (14 - 23)  SpO2: 97% (20 Nov 2020 05:00) (96% - 100%)    Physical Exam:  Gen: No Acute Distress, resting comfortable in bed  Pulm: Normal work of breathing, no wheezes/rhonchi/rales   GI: soft, appropriately tender, nondistended, +BS, no rebound, no guarding, Incision site clean/dry/intact   : bedolla in place   Ext: SCDs in place, no edema/erythema/tenderness     I&O's Summary    19 Nov 2020 07:01  -  20 Nov 2020 07:00  --------------------------------------------------------  IN: 1275 mL / OUT: 1225 mL / NET: 50 mL      MEDICATIONS  (STANDING):  acetaminophen   Tablet .. 975 milliGRAM(s) Oral every 6 hours  dextrose 40% Gel 15 Gram(s) Oral Once  dextrose 5%. 1000 milliLiter(s) (50 mL/Hr) IV Continuous <Continuous>  dextrose 5%. 1000 milliLiter(s) (100 mL/Hr) IV Continuous <Continuous>  dextrose 50% Injectable 25 Gram(s) IV Push Once  dextrose 50% Injectable 12.5 Gram(s) IV Push Once  dextrose 50% Injectable 25 Gram(s) IV Push Once  enoxaparin Injectable 40 milliGRAM(s) SubCutaneous every 24 hours  glucagon  Injectable 1 milliGRAM(s) IntraMuscular Once  influenza  Vaccine (HIGH DOSE) 0.7 milliLiter(s) IntraMuscular once  insulin lispro (ADMELOG) corrective regimen sliding scale   SubCutaneous three times a day before meals  lactated ringers. 1000 milliLiter(s) (125 mL/Hr) IV Continuous <Continuous>  trimethoprim  160 mG/sulfamethoxazole 800 mG 1 Tablet(s) Oral two times a day    MEDICATIONS  (PRN):  HYDROmorphone  Injectable 0.5 milliGRAM(s) IV Push every 15 minutes PRN Severe Pain (7 - 10)  ondansetron Injectable 8 milliGRAM(s) IV Push every 6 hours PRN Nausea and/or Vomiting  oxyCODONE    IR 5 milliGRAM(s) Oral every 4 hours PRN Moderate Pain (4 - 6)  oxyCODONE    IR 10 milliGRAM(s) Oral every 6 hours PRN Severe Pain (7 - 10)  senna 1 Tablet(s) Oral at bedtime PRN Constipation  simethicone 80 milliGRAM(s) Chew every 6 hours PRN Gas      LABS:

## 2020-11-20 NOTE — DISCHARGE NOTE NURSING/CASE MANAGEMENT/SOCIAL WORK - PATIENT PORTAL LINK FT
You can access the FollowMyHealth Patient Portal offered by [facility] by registering at the following website: [email or web address]. By joining [employer]’s FollowMyHealth portal, you will also be able to view your health information using other applications (apps) compatible with our system.

## 2020-11-20 NOTE — DISCHARGE NOTE PROVIDER - NSDCMRMEDTOKEN_GEN_ALL_CORE_FT
atenolol 25 mg oral tablet: 1 tab(s) orally once a day  Jardiance 10 mg oral tablet: 1 tab(s) orally once a day (in the morning)  losartan 25 mg oral tablet: 1 tab(s) orally once a day  metFORMIN 850 mg oral tablet: orally 2 times a day  pravastatin 20 mg oral tablet: orally once a day (at bedtime)   sulfamethoxazole-trimethoprim 800 mg-160 mg oral tablet: 1 tab(s) orally 2 times a day  sulfamethoxazole-trimethoprim 800 mg-160 mg oral tablet: 1 tab(s) orally 2 times a day

## 2020-11-20 NOTE — DISCHARGE NOTE PROVIDER - NSDCACTIVITY_GEN_ALL_CORE
Walking - Indoors allowed/Walking - Outdoors allowed/Showering allowed/No heavy lifting/straining/Stairs allowed

## 2020-11-20 NOTE — DISCHARGE NOTE PROVIDER - NSDCCPCAREPLAN_GEN_ALL_CORE_FT
PRINCIPAL DISCHARGE DIAGNOSIS  Diagnosis: Prolapse of female pelvic organs  Assessment and Plan of Treatment:

## 2020-11-20 NOTE — PROGRESS NOTE ADULT - SUBJECTIVE AND OBJECTIVE BOX
Pt seen and examined at bedside. Pt states mild abdominal pain that improves with pain medications. Pt is not yet ambulating, tolerating clear diet, not yet passing flatus, and has bedolla catheter in place.  Pt denies fever, chills, chest pain, SOB, nausea, vomiting, lightheadedness, dizziness.      T(F): 97.9 (11-20-20 @ 05:00), Max: 99.1 (11-19-20 @ 20:24)  HR: 59 (11-20-20 @ 05:00) (50 - 59)  BP: 121/69 (11-20-20 @ 05:00) (95/51 - 130/61)  RR: 16 (11-20-20 @ 05:00) (14 - 23)  SpO2: 97% (11-20-20 @ 05:00) (96% - 100%)  Wt(kg): --  I&O's Summary    19 Nov 2020 07:01  -  20 Nov 2020 07:00  --------------------------------------------------------  IN: 1275 mL / OUT: 1225 mL / NET: 50 mL        MEDICATIONS  (STANDING):  acetaminophen   Tablet .. 975 milliGRAM(s) Oral every 6 hours  dextrose 40% Gel 15 Gram(s) Oral Once  dextrose 5%. 1000 milliLiter(s) (50 mL/Hr) IV Continuous <Continuous>  dextrose 5%. 1000 milliLiter(s) (100 mL/Hr) IV Continuous <Continuous>  dextrose 50% Injectable 25 Gram(s) IV Push Once  dextrose 50% Injectable 12.5 Gram(s) IV Push Once  dextrose 50% Injectable 25 Gram(s) IV Push Once  enoxaparin Injectable 40 milliGRAM(s) SubCutaneous every 24 hours  glucagon  Injectable 1 milliGRAM(s) IntraMuscular Once  influenza  Vaccine (HIGH DOSE) 0.7 milliLiter(s) IntraMuscular once  insulin lispro (ADMELOG) corrective regimen sliding scale   SubCutaneous three times a day before meals  lactated ringers. 1000 milliLiter(s) (125 mL/Hr) IV Continuous <Continuous>  trimethoprim  160 mG/sulfamethoxazole 800 mG 1 Tablet(s) Oral two times a day    MEDICATIONS  (PRN):  HYDROmorphone  Injectable 0.5 milliGRAM(s) IV Push every 15 minutes PRN Severe Pain (7 - 10)  ondansetron Injectable 8 milliGRAM(s) IV Push every 6 hours PRN Nausea and/or Vomiting  oxyCODONE    IR 5 milliGRAM(s) Oral every 4 hours PRN Moderate Pain (4 - 6)  oxyCODONE    IR 10 milliGRAM(s) Oral every 6 hours PRN Severe Pain (7 - 10)  senna 1 Tablet(s) Oral at bedtime PRN Constipation  simethicone 80 milliGRAM(s) Chew every 6 hours PRN Gas      Physical Exam:  Constitutional: NAD  Pulmonary: normal work of breathing  Abdomen: incision sites clean, dry, intact. Soft, mildly tender, mildly distended, no guarding, no rebound  Extremities: no lower extremity edema or calve tenderness. SCDs in place     LABS:                        12.3   8.18  )-----------( 162      ( 20 Nov 2020 07:20 )             38.1

## 2020-11-20 NOTE — DISCHARGE NOTE PROVIDER - HOSPITAL COURSE
Patient was admitted postoperatively after an uncomplicated RA TLH, BS and RA sacrocolpopexy with cystoscopy.  SHe is being discharged home in stable condition.

## 2020-11-20 NOTE — PROGRESS NOTE ADULT - ASSESSMENT
75y Female POD#1    s/p RA TLH, BSO, RA sacrocolpopexy and cystoscopy.                                        1. Neuro/Pain:  tylenol, oxycodone  2  CV:   VS per routine  3. Pulm: Encourage ISS  4. GI: regular  5. :  Maravilla in place, active TOV this morning  6. Heme: AM CBC  7. ID: --  8. DVT ppx: SCDs  9. Dispo: Likely POD#1 or 2

## 2020-11-24 DIAGNOSIS — E78.5 HYPERLIPIDEMIA, UNSPECIFIED: ICD-10-CM

## 2020-11-24 DIAGNOSIS — I10 ESSENTIAL (PRIMARY) HYPERTENSION: ICD-10-CM

## 2020-11-24 DIAGNOSIS — I25.10 ATHEROSCLEROTIC HEART DISEASE OF NATIVE CORONARY ARTERY WITHOUT ANGINA PECTORIS: ICD-10-CM

## 2020-11-24 DIAGNOSIS — Z79.84 LONG TERM (CURRENT) USE OF ORAL HYPOGLYCEMIC DRUGS: ICD-10-CM

## 2020-11-24 DIAGNOSIS — N81.4 UTEROVAGINAL PROLAPSE, UNSPECIFIED: ICD-10-CM

## 2020-11-24 DIAGNOSIS — E11.9 TYPE 2 DIABETES MELLITUS WITHOUT COMPLICATIONS: ICD-10-CM

## 2020-11-24 DIAGNOSIS — N83.201 UNSPECIFIED OVARIAN CYST, RIGHT SIDE: ICD-10-CM

## 2020-11-24 PROBLEM — N83.209 UNSPECIFIED OVARIAN CYST, UNSPECIFIED SIDE: Chronic | Status: ACTIVE | Noted: 2020-11-18

## 2020-11-24 PROBLEM — E78.00 PURE HYPERCHOLESTEROLEMIA, UNSPECIFIED: Chronic | Status: ACTIVE | Noted: 2020-11-18

## 2020-11-25 LAB — SURGICAL PATHOLOGY STUDY: SIGNIFICANT CHANGE UP

## 2020-11-28 NOTE — ASSESSMENT
[FreeTextEntry1] : The pt has appropriate healing and is doing welll. She is pleased with the outcome.   Questions were answered and pathology report was reviewed. We discussed the intra-operative findings of her poor tissue including. her vagina.  She was advised of increased risk of recurrence and mesh erosion.   She will f/u in 2-3 months.\par

## 2020-11-30 ENCOUNTER — APPOINTMENT (OUTPATIENT)
Dept: GYNECOLOGIC ONCOLOGY | Facility: CLINIC | Age: 75
End: 2020-11-30
Payer: MEDICARE

## 2020-11-30 ENCOUNTER — APPOINTMENT (OUTPATIENT)
Dept: UROGYNECOLOGY | Facility: CLINIC | Age: 75
End: 2020-11-30
Payer: MEDICARE

## 2020-11-30 VITALS
BODY MASS INDEX: 24.8 KG/M2 | TEMPERATURE: 96.6 F | HEART RATE: 48 BPM | HEIGHT: 63 IN | SYSTOLIC BLOOD PRESSURE: 133 MMHG | DIASTOLIC BLOOD PRESSURE: 86 MMHG | WEIGHT: 140 LBS | OXYGEN SATURATION: 97 % | RESPIRATION RATE: 15 BRPM

## 2020-11-30 DIAGNOSIS — Z98.890 OTHER SPECIFIED POSTPROCEDURAL STATES: ICD-10-CM

## 2020-11-30 PROCEDURE — 99024 POSTOP FOLLOW-UP VISIT: CPT

## 2020-11-30 NOTE — ASSESSMENT
[FreeTextEntry1] : - Patient healing well\par - Benign pathology discussed\par - Follow up in 3 weeks for vaginal cuff exam with Dr. Clayton

## 2020-11-30 NOTE — REASON FOR VISIT
[FreeTextEntry1] : POD #11. Pt is doing well, no complaints. She is having normal appetite and bowel movements. Denies abdominal pain or vaginal bleeding.

## 2020-11-30 NOTE — PHYSICAL EXAM
[Normal] : No focal neurologic defects observed [de-identified] : lap incisions c/d/i. Abdomen soft nontender, nondistended [Restricted in physically strenuous activity but ambulatory and able to carry out work of a light or sedentary nature] : Status 1- Restricted in physically strenuous activity but ambulatory and able to carry out work of a light or sedentary nature, e.g., light house work, office work

## 2020-11-30 NOTE — HISTORY OF PRESENT ILLNESS
[FreeTextEntry1] : Problem List\par 1. Right ovarian cyst\par 2. Endometrial stripe 5mm\par \par Previous Therapy\par 1. Office pelvic sonogram 9/2020\par     a) right ovarian complex cyst 4.3 x 3.6cm \par 2.  9/22/20 - 9 Normal \par 3. MRI Pelvis 10/5/20\par     a)  uterus is 5.2cm with 0.5cm endometrial stripe \par     a) The right ovary is enlarged containing a 4.3 x 3.0 x 3.3cm multiloculated cyst with enhancing septations suspicious for cystic ovarian neoplasm. No enhancing soft tissue components are seen. \par 4. S/P Robotic Assisted TLH/BSO\par a)Cervix - Acute and chronic inflammation with squamous metaplasia and nabothian cysts\par b)Endometrium - Atrophic endometrium with remote cystic dilatation of glands\par c) Myometrium - Adenomyosis with adenomyoma- Leiomyoma, Serosa - Endometriosis\par d) Right fallopian tube and ovary - Benign multicystic kimo-ovarian mesothelial\par proliferation most consistent with multilocular peritoneal inclusion cysts\par Note: The cystic lesion is adjacent to the ovary and shows delicate fibrous strands lined by bland\par mesothelial cells (cells stain positive with pancytokeratin, WT1 and calretinin supporting a mesothelial origin)\par Left fallopian tube and ovary - Endometriosis with tubo-ovarian adhesions\par - Focalthrombophlebitis with recanalization

## 2020-11-30 NOTE — ASSESSMENT
[FreeTextEntry1] : The pt has appropriate healing and is doing welll. She is pleased with the outcome.   Questions were answered and pathology report was reviewed.  She will f/u in 3 months\par

## 2020-11-30 NOTE — OBJECTIVE
[Clean, Dry, Intact] : Clean, Dry, Intact [Good Support] : Good support [Healing well] : healing well [No Masses or Tenderness] : no masses or tenderness

## 2020-11-30 NOTE — CHIEF COMPLAINT
[FreeTextEntry1] : 74 y/o referred from Dr. Richards for further evaluation of right ovarian cyst. This was an incidental finding. Patient is asymptomatic. \par \par ObHx:  x 1\par GynHx: as above\par PmHx: HTN, DM\par Sx: none\par Meds: see med rec\par Allergies: NKDA\par SocialHx: Retired, part time real estate, , no toxic habits\par FamHx: Sisters breast cancer 82, sister 59\par \par Health Maintenance \par Last pap: 20- neg, hpv (not sent)\par Mammo: up to date\par Colon cancer screening: stool tests up to date

## 2020-12-04 LAB — BACTERIA UR CULT: ABNORMAL

## 2020-12-04 RX ORDER — CIPROFLOXACIN HYDROCHLORIDE 500 MG/1
500 TABLET, FILM COATED ORAL TWICE DAILY
Qty: 10 | Refills: 0 | Status: ACTIVE | COMMUNITY
Start: 2020-12-04 | End: 1900-01-01

## 2020-12-04 RX ORDER — ESTRADIOL 0.1 MG/G
0.1 CREAM VAGINAL
Qty: 1 | Refills: 3 | Status: ACTIVE | COMMUNITY
Start: 2020-12-04 | End: 1900-01-01

## 2020-12-18 ENCOUNTER — APPOINTMENT (OUTPATIENT)
Dept: GYNECOLOGIC ONCOLOGY | Facility: CLINIC | Age: 75
End: 2020-12-18
Payer: MEDICARE

## 2020-12-18 VITALS
HEIGHT: 63 IN | TEMPERATURE: 97.2 F | OXYGEN SATURATION: 98 % | SYSTOLIC BLOOD PRESSURE: 119 MMHG | WEIGHT: 140 LBS | DIASTOLIC BLOOD PRESSURE: 68 MMHG | HEART RATE: 47 BPM | RESPIRATION RATE: 16 BRPM | BODY MASS INDEX: 24.8 KG/M2

## 2020-12-18 DIAGNOSIS — N83.291 OTHER OVARIAN CYST, RIGHT SIDE: ICD-10-CM

## 2020-12-18 DIAGNOSIS — N85.8 OTHER SPECIFIED NONINFLAMMATORY DISORDERS OF UTERUS: ICD-10-CM

## 2020-12-18 PROCEDURE — 99024 POSTOP FOLLOW-UP VISIT: CPT

## 2020-12-18 NOTE — PHYSICAL EXAM
[Restricted in physically strenuous activity but ambulatory and able to carry out work of a light or sedentary nature] : Status 1- Restricted in physically strenuous activity but ambulatory and able to carry out work of a light or sedentary nature, e.g., light house work, office work [Absent] : Adnexa(ae): Absent [Normal] : Anus and perineum: Normal sphincter tone, no masses, no prolapse. [de-identified] : lap incisions c/d/i. Abdomen soft nontender, nondistended [de-identified] : vicryl stitches present at apex. Vaginal well reapproximated.

## 2020-12-18 NOTE — ASSESSMENT
[FreeTextEntry1] : Patient doing well. She is very "happy" with her surgery.\par \par I encouraged ongoing routine gyn care with annual visits. Patient would like to stay in our practice since it is affiliated with [facility]. I explained that she will be seen in the PA panel with Cassandra and I will be consulted as needed. \par \par Follow up sooner as needed.

## 2020-12-18 NOTE — HISTORY OF PRESENT ILLNESS
[FreeTextEntry1] : Problem List\par 1. Right ovarian cyst\par 2. Endometrial stripe 5mm\par \par Previous Therapy\par 1. Office pelvic sonogram 9/2020\par     a) right ovarian complex cyst 4.3 x 3.6cm \par 2.  9/22/20 - 9 Normal \par 3. MRI Pelvis 10/5/20\par     a)  uterus is 5.2cm with 0.5cm endometrial stripe \par     a) The right ovary is enlarged containing a 4.3 x 3.0 x 3.3cm multiloculated cyst with enhancing septations suspicious for cystic ovarian neoplasm. No enhancing soft tissue components are seen. \par 4. S/P Robotic Assisted TLH/BSO 11/19/20 \par a)Cervix - Acute and chronic inflammation with squamous metaplasia and nabothian cysts\par b)Endometrium - Atrophic endometrium with remote cystic dilatation of glands\par c) Myometrium - Adenomyosis with adenomyoma- Leiomyoma, Serosa - Endometriosis\par d) Right fallopian tube and ovary - Benign multicystic kimo-ovarian mesothelial\par proliferation most consistent with multilocular peritoneal inclusion cysts\par Note: The cystic lesion is adjacent to the ovary and shows delicate fibrous strands lined by bland\par mesothelial cells (cells stain positive with pancytokeratin, WT1 and calretinin supporting a mesothelial origin)\par Left fallopian tube and ovary - Endometriosis with tubo-ovarian adhesions\par - Focalthrombophlebitis with recanalization

## 2020-12-18 NOTE — REASON FOR VISIT
[FreeTextEntry1] : 1 month post op. \par \par Patient is doing well. No complaints. She is very pleased with her surgical outcome and recovery.

## 2020-12-23 PROBLEM — N39.0 UTI (URINARY TRACT INFECTION), BACTERIAL: Status: RESOLVED | Noted: 2020-11-02 | Resolved: 2020-12-23

## 2021-03-08 ENCOUNTER — APPOINTMENT (OUTPATIENT)
Dept: UROGYNECOLOGY | Facility: CLINIC | Age: 76
End: 2021-03-08
Payer: MEDICARE

## 2021-03-08 DIAGNOSIS — N81.4 UTEROVAGINAL PROLAPSE, UNSPECIFIED: ICD-10-CM

## 2021-03-08 DIAGNOSIS — N95.2 POSTMENOPAUSAL ATROPHIC VAGINITIS: ICD-10-CM

## 2021-03-08 PROCEDURE — 51798 US URINE CAPACITY MEASURE: CPT

## 2021-03-08 PROCEDURE — 99072 ADDL SUPL MATRL&STAF TM PHE: CPT

## 2021-03-08 PROCEDURE — 99213 OFFICE O/P EST LOW 20 MIN: CPT

## 2021-03-08 NOTE — HISTORY OF PRESENT ILLNESS
[FreeTextEntry1] : The pt is here for a f/u visit after undergoing robotic hyst with BSO by DR Clayton and colpopexy by me in 11/20202.\par She reports doing well.  Denies UI or voiding dysfunction.

## 2021-03-08 NOTE — ASSESSMENT
[FreeTextEntry1] : The pt is doing well.  advised the pt to apply the estrogen cream.\par She will f/u on PRN bases

## 2021-03-08 NOTE — PHYSICAL EXAM
[No Acute Distress] : in no acute distress [Well developed] : well developed [Well Nourished] : ~L well nourished [Good Hygeine] : demonstrates good hygeine [Normal Appearance] : general appearance was normal [Atrophy] : atrophy [No Bleeding] : there was no active vaginal bleeding [Estrogen Effect] : no estrogen effect was observed [FreeTextEntry4] : no mesh extrusion [de-identified] : excellent support
